# Patient Record
Sex: FEMALE | Race: WHITE | URBAN - METROPOLITAN AREA
[De-identification: names, ages, dates, MRNs, and addresses within clinical notes are randomized per-mention and may not be internally consistent; named-entity substitution may affect disease eponyms.]

---

## 2018-02-15 ENCOUNTER — OFFICE VISIT (OUTPATIENT)
Dept: DERMATOLOGY | Age: 33
End: 2018-02-15

## 2018-02-15 DIAGNOSIS — L72.0 MILIA: ICD-10-CM

## 2018-02-15 DIAGNOSIS — L70.9 ADULT ACNE: Primary | ICD-10-CM

## 2018-02-15 DIAGNOSIS — Z78.9 NON-TOBACCO USER: ICD-10-CM

## 2018-02-15 PROCEDURE — 99202 OFFICE O/P NEW SF 15 MIN: CPT | Performed by: DERMATOLOGY

## 2018-02-15 PROCEDURE — 11900 INJECT SKIN LESIONS </W 7: CPT | Performed by: DERMATOLOGY

## 2018-02-15 RX ORDER — THYROID 60 MG/1
TABLET ORAL
COMMUNITY
Start: 2018-02-13 | End: 2018-03-26 | Stop reason: ALTCHOICE

## 2018-02-15 RX ORDER — DOXYCYCLINE HYCLATE 100 MG/1
CAPSULE ORAL
Refills: 2 | COMMUNITY
Start: 2018-01-20 | End: 2018-02-15 | Stop reason: ALTCHOICE

## 2018-02-15 RX ORDER — DAPSONE 50 MG/G
GEL TOPICAL
Qty: 60 G | Refills: 1 | Status: SHIPPED | OUTPATIENT
Start: 2018-02-15 | End: 2018-02-15 | Stop reason: SDUPTHER

## 2018-02-15 RX ORDER — DAPSONE 50 MG/G
GEL TOPICAL
Qty: 60 G | Refills: 1 | Status: SHIPPED | OUTPATIENT
Start: 2018-02-15 | End: 2018-03-26 | Stop reason: ALTCHOICE

## 2018-02-15 NOTE — PROGRESS NOTES
[facility] ([facility]) Dermatology  [address], [place], [address]       Trina Barros  1985    29 y.o. female     Date of Visit: 2/15/2018    Chief Complaint:   Chief Complaint   Patient presents with    Acne     been going on for 2.5 years, cystic, been treated with a steroid inj previously, just on face        I was asked to see this patient by Dr. Marroquin ref. provider found. History of Present Illness:  Trina Barros is a 29 y.o. female who presents with the chief complaint of establish care and for the followin. Complains of acne for about 2 and half years, somewhat painful cystic like acne to her cheeks primarily but also gets bumps to her chin and forehead, has been treated with injections of kenalog previously for acute inflamed bump to her right cheek. Currently has a very tender large cystic-like acne bump to her left cheek. Denies involvement on her chest, back, shoulders. She was prescribed doxycycline 100mg b.i.d. By prior dermatologist and took it for 6 months before they tapered her off. She has not noticed a significant difference in her acne flares while taking doxycycline. Does have a topical retinoid at home that she used to use but has discontinued for 1-2 months. She tolerated it well without dryness or irritation. Does not remember strength of dose. Attributes the acne occurring after her pregnancy with her son. She is not currently pregnant or breast-feeding now. She is not trying to get pregnant although will likely attempt to get pregnant again in future years. Review of Systems:  Constitutional: Reports general sense of well-being   Skin: No new or changing moles, no history of keloids or hypertrophic scars. Heme: No abnormal bruising or bleeding. Past Medical History, Family History, Surgical History, Medications and Allergies reviewed. Past Skin Hx:   Patient denies past history of melanoma, NMSC, dysplastic nevi, or chronic skin rashes.     PFHx: Denies

## 2018-02-23 ENCOUNTER — HOSPITAL ENCOUNTER (OUTPATIENT)
Dept: GENERAL RADIOLOGY | Age: 33
Discharge: HOME | End: 2018-02-23
Attending: DERMATOLOGY | Admitting: DERMATOLOGY

## 2018-02-23 DIAGNOSIS — L70.9 ADULT ACNE: ICD-10-CM

## 2018-02-23 LAB
FOLLICLE STIMULATING HORMONE: 2.7 MIU/ML
LUTEINIZING HORMONE: 6 MIU/ML
PROLACTIN: 19.7 NG/ML

## 2018-02-27 ENCOUNTER — TELEPHONE (OUTPATIENT)
Dept: DERMATOLOGY | Age: 33
End: 2018-02-27

## 2018-02-27 DIAGNOSIS — R79.89 LOW TESTOSTERONE: Primary | ICD-10-CM

## 2018-02-27 LAB
DHEAS (DHEA SULFATE): 187 UG/DL (ref 45–270)
SEX HORMONE BINDING GLOBULIN: 32 NMOL/L (ref 30–135)
TESTOSTERONE FREE-NONMALE: 1.8 PG/ML (ref 0.8–7.4)
TESTOSTERONE TOTAL: 10 NG/DL (ref 20–70)

## 2018-02-27 NOTE — TELEPHONE ENCOUNTER
I called to discuss labs with patient and inform her of the decreased testosterone level. Plan to refer patient to endocrinologist to rule out any underlying cause for low testosterone. Potential for it to contribute to her acne. She verbalized understanding. Patient lives in the [place] area. We sent a referral to endocrinologist for further evaluation of low testosterone level and call her with the contact information. She is to continue her current acne treatment regimen and follow-up as scheduled with me.

## 2018-03-07 PROBLEM — R79.89 LOW TESTOSTERONE LEVEL IN FEMALE: Status: ACTIVE | Noted: 2018-03-07

## 2018-03-08 ENCOUNTER — OFFICE VISIT (OUTPATIENT)
Dept: ENDOCRINOLOGY | Age: 33
End: 2018-03-08

## 2018-03-08 VITALS
DIASTOLIC BLOOD PRESSURE: 66 MMHG | WEIGHT: 125 LBS | OXYGEN SATURATION: 100 % | HEART RATE: 58 BPM | SYSTOLIC BLOOD PRESSURE: 103 MMHG

## 2018-03-08 DIAGNOSIS — R79.89 PROLACTIN INCREASED: Primary | ICD-10-CM

## 2018-03-08 DIAGNOSIS — E06.3 HYPOTHYROIDISM DUE TO HASHIMOTO'S THYROIDITIS: ICD-10-CM

## 2018-03-08 DIAGNOSIS — E03.8 HYPOTHYROIDISM DUE TO HASHIMOTO'S THYROIDITIS: ICD-10-CM

## 2018-03-08 DIAGNOSIS — R79.89 PROLACTIN INCREASED: ICD-10-CM

## 2018-03-08 LAB
ANTI-THYROGLOB ABS: 549 IU/ML
PROLACTIN: 8.2 NG/ML
T3 FREE: 2 PG/ML (ref 2.3–4.2)
T4 FREE: 0.7 NG/DL (ref 0.9–1.8)
THYROID PEROXIDASE (TPO) ABS: 195 IU/ML
TSH SERPL DL<=0.05 MIU/L-ACNC: 5.95 UIU/ML (ref 0.27–4.2)

## 2018-03-08 PROCEDURE — 99244 OFF/OP CNSLTJ NEW/EST MOD 40: CPT | Performed by: INTERNAL MEDICINE

## 2018-03-08 RX ORDER — LIOTHYRONINE SODIUM 5 UG/1
TABLET ORAL
Qty: 60 TABLET | Refills: 2 | Status: SHIPPED | OUTPATIENT
Start: 2018-03-08 | End: 2018-04-23

## 2018-03-08 RX ORDER — LEVOTHYROXINE SODIUM 88 MCG
88 TABLET ORAL
Qty: 56 TABLET | Refills: 0 | COMMUNITY
Start: 2018-03-08 | End: 2018-04-23

## 2018-03-08 ASSESSMENT — PATIENT HEALTH QUESTIONNAIRE - PHQ9
SUM OF ALL RESPONSES TO PHQ QUESTIONS 1-9: 0
1. LITTLE INTEREST OR PLEASURE IN DOING THINGS: 0
SUM OF ALL RESPONSES TO PHQ9 QUESTIONS 1 & 2: 0
2. FEELING DOWN, DEPRESSED OR HOPELESS: 0

## 2018-03-08 NOTE — PROGRESS NOTES
Patient ID: Esteban Salazar is a 29 y.o. female    Chief Complaint: Hashimoto's Hypothyroidism and low testosterone   Referred by Dr. Ronny Braun (derm)     HPI:   Esteban Salazar is here for initial evaluation of hypothyroidism and low T. On thyroid replacement since  2012. She takes Monia Otoole 65mg daily in morning empty stomach with water. And waits for an hour. Energy levels low in afternoon. Denies hair or skin changes   Weight stable on watching diet   Usually cold   Denies depression or anxiety   Denies excessive sweating, tremors, palpitations, sleep issues   Denies compressive neck symptoms   Menstrual cycles are regular    Family history of thyroid disorder: None   No Neck radiation  No Recent iodine loading in form of contrast material for diagnostic studies/cardiac cath  No Food supplements, herbs or medications including Biotin except MVT    Low Testosterone:  Feb 2018: TT 10, Free T normal 1.8, Lh, FSH normal   Prolactin 19.7   Saw Derm for one cystic acne, last episode 2.5 years ago   Ca[place]asians   No PCOS in family   No nipple discharge   No issues with infertility   No plans to conceive     She thinks she has gluten sensitivity. Avoids gluten diet. The following portions of the patient's history were reviewed and updated as appropriate:       Family History   Problem Relation Age of Onset    Heart Disease Paternal Grandfather             Social History     Social History    Marital status:      Spouse name: N/A    Number of children: N/A    Years of education: N/A     Occupational History    Not on file.      Social History Main Topics    Smoking status: Never Smoker    Smokeless tobacco: Never Used    Alcohol use Yes      Comment: rarely     Drug use: No    Sexual activity: Yes     Partners: Male     Other Topics Concern    Not on file     Social History Narrative    No narrative on file           Past Medical History:   Diagnosis Date    Hypothyroidism     Low TSH, Ft4, Ft3, thyroid abs and prolactin, macroprolactin today       Electronically signed by Asha Allen MD on 3/8/2018 at 10:24 AM

## 2018-03-10 LAB — MISCELLANEOUS LAB TEST ORDER: NORMAL

## 2018-03-12 DIAGNOSIS — E06.3 HYPOTHYROIDISM DUE TO HASHIMOTO'S THYROIDITIS: ICD-10-CM

## 2018-03-12 DIAGNOSIS — E03.8 HYPOTHYROIDISM DUE TO HASHIMOTO'S THYROIDITIS: ICD-10-CM

## 2018-03-26 ENCOUNTER — OFFICE VISIT (OUTPATIENT)
Dept: DERMATOLOGY | Age: 33
End: 2018-03-26

## 2018-03-26 DIAGNOSIS — L70.9 ADULT ACNE: Primary | ICD-10-CM

## 2018-03-26 DIAGNOSIS — L72.3 INFLAMED SEBACEOUS CYST: ICD-10-CM

## 2018-03-26 PROCEDURE — 99213 OFFICE O/P EST LOW 20 MIN: CPT | Performed by: DERMATOLOGY

## 2018-03-26 PROCEDURE — 11900 INJECT SKIN LESIONS </W 7: CPT | Performed by: DERMATOLOGY

## 2018-03-26 RX ORDER — DAPSONE 75 MG/G
GEL TOPICAL
COMMUNITY
End: 2018-03-26 | Stop reason: SDUPTHER

## 2018-03-26 RX ORDER — DAPSONE 75 MG/G
GEL TOPICAL
Qty: 60 G | Refills: 1 | Status: SHIPPED | OUTPATIENT
Start: 2018-03-26

## 2018-03-26 NOTE — PROGRESS NOTES
History:   Diagnosis Date    Hypothyroidism     Low testosterone level in female      Past Surgical History:   Procedure Laterality Date    TONSILLECTOMY AND ADENOIDECTOMY      TYMPANOSTOMY TUBE PLACEMENT Bilateral        No Known Allergies  Outpatient Prescriptions Marked as Taking for the 3/26/18 encounter (Office Visit) with Wily Olivarez, DO   Medication Sig Dispense Refill    Dapsone 7.5 % GEL Apply thin layer to face every morning 60 g 1    liothyronine (CYTOMEL) 5 MCG tablet 5mcg tab, 7am and 1pm. Orally. 60 tablet 2    SYNTHROID 88 MCG tablet Take 1 tablet by mouth every morning (before breakfast) Lot number [id number]  Ex 6/12/2018 56 tablet 0       Social History:   Social History     Social History    Marital status:      Spouse name: N/A    Number of children: N/A    Years of education: N/A     Occupational History    Not on file. Social History Main Topics    Smoking status: Never Smoker    Smokeless tobacco: Never Used    Alcohol use Yes      Comment: rarely     Drug use: No    Sexual activity: Yes     Partners: Male     Other Topics Concern    Not on file     Social History Narrative    No narrative on file       Physical Examination     The following were examined and determined to be normal: Psych/Neuro, Conjunctivae/eyelids, Gums/teeth/lips, Neck and Breast/axilla/chest.    The following were examined and determined to be abnormal: Head/face. -General: NAD, well-nourished, well-developed. Areas of skin examined as listed above:   1. Inflammatory cystic nodule located to left malar cheek and left chin, a few scattered closed comedones and inflammatory papules to chin, cheeks, forehead    Assessment and Plan     1. Adult acne    2. Inflamed sebaceous cyst        1. Adult acne  Worsened  Due to location and cystic component of acne, an addition of oral spironolactone recommended.      I discussed the importance using mild gentle cleansers like OTC Cetaphil, washing

## 2018-03-26 NOTE — PATIENT INSTRUCTIONS
Have your labs drawn and once results are received and are normal we will send in RX for spironolactone

## 2018-03-30 ENCOUNTER — HOSPITAL ENCOUNTER (OUTPATIENT)
Dept: GENERAL RADIOLOGY | Age: 33
Discharge: HOME | End: 2018-03-30
Attending: DERMATOLOGY | Admitting: DERMATOLOGY

## 2018-03-30 DIAGNOSIS — L70.9 ADULT ACNE: ICD-10-CM

## 2018-03-30 LAB
A/G RATIO: 2 (ref 1.1–2.2)
ALBUMIN SERPL-MCNC: 4.8 G/DL (ref 3.4–5)
ALP BLD-CCNC: 69 U/L (ref 40–129)
ALT SERPL-CCNC: 17 U/L (ref 10–40)
ANION GAP SERPL CALCULATED.3IONS-SCNC: 14 MMOL/L (ref 3–16)
AST SERPL-CCNC: 26 U/L (ref 15–37)
BASOPHILS ABSOLUTE: 0.1 K/UL (ref 0–0.2)
BASOPHILS RELATIVE PERCENT: 0.9 %
BILIRUB SERPL-MCNC: 0.4 MG/DL (ref 0–1)
BUN BLDV-MCNC: 12 MG/DL (ref 7–20)
CALCIUM SERPL-MCNC: 9.1 MG/DL (ref 8.3–10.6)
CHLORIDE BLD-SCNC: 102 MMOL/L (ref 99–110)
CO2: 25 MMOL/L (ref 21–32)
CREAT SERPL-MCNC: 0.8 MG/DL (ref 0.6–1.1)
EOSINOPHILS ABSOLUTE: 0.1 K/UL (ref 0–0.6)
EOSINOPHILS RELATIVE PERCENT: 2.2 %
GFR AFRICAN AMERICAN: >60
GFR NON-AFRICAN AMERICAN: >60
GLOBULIN: 2.4 G/DL
GLUCOSE BLD-MCNC: 73 MG/DL (ref 70–99)
HCG QUALITATIVE: NEGATIVE
HCT VFR BLD CALC: 36.7 % (ref 36–48)
HEMOGLOBIN: 12.4 G/DL (ref 12–16)
LYMPHOCYTES ABSOLUTE: 0.9 K/UL (ref 1–5.1)
LYMPHOCYTES RELATIVE PERCENT: 15.7 %
MCH RBC QN AUTO: 30.3 PG (ref 26–34)
MCHC RBC AUTO-ENTMCNC: 33.8 G/DL (ref 31–36)
MCV RBC AUTO: 89.6 FL (ref 80–100)
MONOCYTES ABSOLUTE: 0.3 K/UL (ref 0–1.3)
MONOCYTES RELATIVE PERCENT: 5.6 %
NEUTROPHILS ABSOLUTE: 4.5 K/UL (ref 1.7–7.7)
NEUTROPHILS RELATIVE PERCENT: 75.6 %
PDW BLD-RTO: 14.7 % (ref 12.4–15.4)
PLATELET # BLD: 203 K/UL (ref 135–450)
PMV BLD AUTO: 10.4 FL (ref 5–10.5)
POTASSIUM SERPL-SCNC: 4.5 MMOL/L (ref 3.5–5.1)
RBC # BLD: 4.09 M/UL (ref 4–5.2)
SODIUM BLD-SCNC: 141 MMOL/L (ref 136–145)
TOTAL PROTEIN: 7.2 G/DL (ref 6.4–8.2)
WBC # BLD: 6 K/UL (ref 4–11)

## 2018-04-02 DIAGNOSIS — L70.9 ADULT ACNE: Primary | ICD-10-CM

## 2018-04-02 RX ORDER — SPIRONOLACTONE 25 MG/1
TABLET ORAL
Qty: 30 TABLET | Refills: 0 | Status: SHIPPED | OUTPATIENT
Start: 2018-04-02 | End: 2018-04-30 | Stop reason: SDUPTHER

## 2018-04-03 ENCOUNTER — TELEPHONE (OUTPATIENT)
Dept: DERMATOLOGY | Age: 33
End: 2018-04-03

## 2018-04-19 ENCOUNTER — TELEPHONE (OUTPATIENT)
Dept: ENDOCRINOLOGY | Age: 33
End: 2018-04-19

## 2018-04-20 ENCOUNTER — OFFICE VISIT (OUTPATIENT)
Dept: ENDOCRINOLOGY | Age: 33
End: 2018-04-20

## 2018-04-20 VITALS
OXYGEN SATURATION: 99 % | HEART RATE: 76 BPM | WEIGHT: 124 LBS | BODY MASS INDEX: 22.82 KG/M2 | DIASTOLIC BLOOD PRESSURE: 60 MMHG | SYSTOLIC BLOOD PRESSURE: 100 MMHG | HEIGHT: 62 IN

## 2018-04-20 DIAGNOSIS — E03.8 HYPOTHYROIDISM DUE TO HASHIMOTO'S THYROIDITIS: ICD-10-CM

## 2018-04-20 DIAGNOSIS — R79.89 LOW TESTOSTERONE LEVEL IN FEMALE: ICD-10-CM

## 2018-04-20 DIAGNOSIS — E03.8 HYPOTHYROIDISM DUE TO HASHIMOTO'S THYROIDITIS: Primary | ICD-10-CM

## 2018-04-20 DIAGNOSIS — E06.3 HYPOTHYROIDISM DUE TO HASHIMOTO'S THYROIDITIS: Primary | ICD-10-CM

## 2018-04-20 DIAGNOSIS — R79.89 PROLACTIN INCREASED: ICD-10-CM

## 2018-04-20 DIAGNOSIS — E06.3 HYPOTHYROIDISM DUE TO HASHIMOTO'S THYROIDITIS: ICD-10-CM

## 2018-04-20 PROCEDURE — 99214 OFFICE O/P EST MOD 30 MIN: CPT | Performed by: INTERNAL MEDICINE

## 2018-04-20 ASSESSMENT — PATIENT HEALTH QUESTIONNAIRE - PHQ9
SUM OF ALL RESPONSES TO PHQ QUESTIONS 1-9: 0
SUM OF ALL RESPONSES TO PHQ9 QUESTIONS 1 & 2: 0
2. FEELING DOWN, DEPRESSED OR HOPELESS: 0
1. LITTLE INTEREST OR PLEASURE IN DOING THINGS: 0

## 2018-04-21 LAB
T3 FREE: 2.4 PG/ML (ref 2.3–4.2)
T4 FREE: 1.3 NG/DL (ref 0.9–1.8)
TSH SERPL DL<=0.05 MIU/L-ACNC: 0.32 UIU/ML (ref 0.27–4.2)

## 2018-04-23 DIAGNOSIS — E03.8 HYPOTHYROIDISM DUE TO HASHIMOTO'S THYROIDITIS: Primary | ICD-10-CM

## 2018-04-23 DIAGNOSIS — E06.3 HYPOTHYROIDISM DUE TO HASHIMOTO'S THYROIDITIS: Primary | ICD-10-CM

## 2018-04-23 RX ORDER — THYROID,PORK 81.25 MG
81.25 TABLET ORAL
Qty: 30 TABLET | Refills: 1 | Status: SHIPPED | OUTPATIENT
Start: 2018-04-23 | End: 2018-06-21 | Stop reason: SDUPTHER

## 2018-04-30 DIAGNOSIS — L70.9 ADULT ACNE: ICD-10-CM

## 2018-04-30 RX ORDER — SPIRONOLACTONE 25 MG/1
TABLET ORAL
Qty: 19 TABLET | Refills: 0 | Status: SHIPPED | OUTPATIENT
Start: 2018-04-30

## 2018-06-16 DIAGNOSIS — L70.9 ADULT ACNE: ICD-10-CM

## 2018-06-18 RX ORDER — SPIRONOLACTONE 25 MG/1
TABLET ORAL
Qty: 19 TABLET | Refills: 0 | OUTPATIENT
Start: 2018-06-18

## 2018-06-21 DIAGNOSIS — E06.3 HYPOTHYROIDISM DUE TO HASHIMOTO'S THYROIDITIS: ICD-10-CM

## 2018-06-21 DIAGNOSIS — E03.8 HYPOTHYROIDISM DUE TO HASHIMOTO'S THYROIDITIS: ICD-10-CM

## 2018-06-21 DIAGNOSIS — L70.9 ADULT ACNE: ICD-10-CM

## 2018-06-21 LAB
A/G RATIO: 2.2 (ref 1.1–2.2)
ALBUMIN SERPL-MCNC: 4.7 G/DL (ref 3.4–5)
ALP BLD-CCNC: 63 U/L (ref 40–129)
ALT SERPL-CCNC: 14 U/L (ref 10–40)
ANION GAP SERPL CALCULATED.3IONS-SCNC: 15 MMOL/L (ref 3–16)
AST SERPL-CCNC: 20 U/L (ref 15–37)
BILIRUB SERPL-MCNC: 0.3 MG/DL (ref 0–1)
BUN BLDV-MCNC: 10 MG/DL (ref 7–20)
CALCIUM SERPL-MCNC: 9.8 MG/DL (ref 8.3–10.6)
CHLORIDE BLD-SCNC: 103 MMOL/L (ref 99–110)
CO2: 23 MMOL/L (ref 21–32)
CREAT SERPL-MCNC: 0.9 MG/DL (ref 0.6–1.1)
GFR AFRICAN AMERICAN: >60
GFR NON-AFRICAN AMERICAN: >60
GLOBULIN: 2.1 G/DL
GLUCOSE BLD-MCNC: 78 MG/DL (ref 70–99)
HCG QUALITATIVE: NEGATIVE
POTASSIUM SERPL-SCNC: 4.2 MMOL/L (ref 3.5–5.1)
SODIUM BLD-SCNC: 141 MMOL/L (ref 136–145)
T3 FREE: 2.3 PG/ML (ref 2.3–4.2)
T4 FREE: 0.8 NG/DL (ref 0.9–1.8)
TOTAL PROTEIN: 6.8 G/DL (ref 6.4–8.2)
TSH SERPL DL<=0.05 MIU/L-ACNC: 2.55 UIU/ML (ref 0.27–4.2)

## 2018-06-22 ENCOUNTER — OFFICE VISIT (OUTPATIENT)
Dept: ENDOCRINOLOGY | Age: 33
End: 2018-06-22

## 2018-06-22 VITALS
BODY MASS INDEX: 23 KG/M2 | SYSTOLIC BLOOD PRESSURE: 105 MMHG | DIASTOLIC BLOOD PRESSURE: 70 MMHG | HEART RATE: 56 BPM | OXYGEN SATURATION: 100 % | HEIGHT: 62 IN | WEIGHT: 125 LBS

## 2018-06-22 DIAGNOSIS — E06.3 HYPOTHYROIDISM DUE TO HASHIMOTO'S THYROIDITIS: Primary | ICD-10-CM

## 2018-06-22 DIAGNOSIS — E03.8 HYPOTHYROIDISM DUE TO HASHIMOTO'S THYROIDITIS: Primary | ICD-10-CM

## 2018-06-22 PROBLEM — R79.89 PROLACTIN INCREASED: Status: RESOLVED | Noted: 2018-03-08 | Resolved: 2018-06-22

## 2018-06-22 PROCEDURE — 99214 OFFICE O/P EST MOD 30 MIN: CPT | Performed by: INTERNAL MEDICINE

## 2018-06-22 RX ORDER — THYROID,PORK 97.5 MG
97.5 TABLET ORAL
Qty: 30 TABLET | Refills: 3 | Status: SHIPPED | OUTPATIENT
Start: 2018-06-22 | End: 2018-06-25

## 2018-06-22 ASSESSMENT — PATIENT HEALTH QUESTIONNAIRE - PHQ9
SUM OF ALL RESPONSES TO PHQ9 QUESTIONS 1 & 2: 0
2. FEELING DOWN, DEPRESSED OR HOPELESS: 0
SUM OF ALL RESPONSES TO PHQ QUESTIONS 1-9: 0
1. LITTLE INTEREST OR PLEASURE IN DOING THINGS: 0

## 2018-06-25 ENCOUNTER — TELEPHONE (OUTPATIENT)
Dept: DERMATOLOGY | Age: 33
End: 2018-06-25

## 2018-06-25 ENCOUNTER — TELEPHONE (OUTPATIENT)
Dept: ENDOCRINOLOGY | Age: 33
End: 2018-06-25

## 2018-06-25 RX ORDER — LEVOTHYROXINE AND LIOTHYRONINE 57; 13.5 UG/1; UG/1
90 TABLET ORAL DAILY
Qty: 30 TABLET | Refills: 3 | Status: SHIPPED | OUTPATIENT
Start: 2018-06-25 | End: 2019-06-25

## 2021-06-29 NOTE — TELEPHONE ENCOUNTER
Replied via Mychart, pt has no showed or canceled last minute for last few appts.  Needs to be seen 29-Jun-2021 21:22